# Patient Record
Sex: FEMALE
[De-identification: names, ages, dates, MRNs, and addresses within clinical notes are randomized per-mention and may not be internally consistent; named-entity substitution may affect disease eponyms.]

---

## 2023-04-17 PROBLEM — Z00.00 ENCOUNTER FOR PREVENTIVE HEALTH EXAMINATION: Status: ACTIVE | Noted: 2023-04-17

## 2023-04-21 ENCOUNTER — APPOINTMENT (OUTPATIENT)
Dept: UROLOGY | Facility: CLINIC | Age: 48
End: 2023-04-21
Payer: COMMERCIAL

## 2023-04-21 VITALS
SYSTOLIC BLOOD PRESSURE: 144 MMHG | DIASTOLIC BLOOD PRESSURE: 75 MMHG | BODY MASS INDEX: 22.4 KG/M2 | TEMPERATURE: 98.3 F | WEIGHT: 131.2 LBS | HEIGHT: 64 IN | HEART RATE: 85 BPM | OXYGEN SATURATION: 100 %

## 2023-04-21 PROCEDURE — 99204 OFFICE O/P NEW MOD 45 MIN: CPT

## 2023-04-21 NOTE — HISTORY OF PRESENT ILLNESS
[FreeTextEntry1] : Name RONN LINTON \par MRN [id number] \par  May 24 1975 \par ------------------------------------------------------------------------------------------------------------------------------------------- \par Date of First Visit: 23\par Referring Provider/PCP: self (Mire[facility])\par ------------------------------------------------------------------------------------------------------------------------------------------- \par CC: interstitial cystitis\par \par History of Present Illness: RONN LINTON is a 47 year old female PMH uterine fibroids hypothyroidism status post right hemithyroidectomy who presents for evaluation and management of interstitial cystitis.  She has a remote history of recurrent urinary tract infections followed by diagnosis with interstitial cystitis approximately 10 years ago.  Her urinary frequency was initially controlled with diet and anti-inflammatory medications.  She states that her symptoms have been okay for a while until 2022 when she underwent a colonoscopy.  She notes she had increased abdominal pain after the procedure and was admitted to the hospital, diagnosed with distention due to gas likely related to the procedure in the setting of incomplete bowel prep.  States that "pelvis did not respond well "to the procedure and the recovery and since then she has had a dull abdominal pain that is on and off in quality, also associated with pressure in the suprapubic region.  Ameliorated by heating pads and relaxation techniques.  In 2022, frequency and dysuria symptoms of old flared up again.  She was started on antibiotics empirically but urine culture drawn prior to starting treatment was negative.  Symptoms initially abated for several weeks but have been constant since 2022. \par \par In the office today, patient states she is mildly symptomatic with lower abdominal/suprapubic pressure.  Urinary urgency comes on sometimes though not bothered at this particular moment with frequency every 2 hours that tends to occur in the late afternoon early evening.  Does not have any nocturia or bothersome frequency or urgency in the morning.  Drinks at least 1 cup of caffeinated coffee or black tea daily.  Never smoker, occasional spicy food intake.\par

## 2023-04-21 NOTE — ASSESSMENT
[FreeTextEntry1] : Assessment: 47-year-old female with interstitial cystitis, currently flaring.\par \par \par Plan:\par -Discussed importance of strict abstinence from all caffeinated products and alcohol\par -Avoid all other bladder irritants such as tobacco products, spicy foods\par -We discussed the potential benefit of medications for symptom relief such as anticholinergics and beta 3 agonist-patient would like to hold off on medical therapy for now and proceed with behavioral therapy alone\par -Follow-up in 3 months for symptom check

## 2023-04-26 ENCOUNTER — APPOINTMENT (OUTPATIENT)
Dept: UROLOGY | Facility: CLINIC | Age: 48
End: 2023-04-26

## 2023-08-02 ENCOUNTER — APPOINTMENT (OUTPATIENT)
Dept: UROLOGY | Facility: CLINIC | Age: 48
End: 2023-08-02

## 2023-08-09 ENCOUNTER — APPOINTMENT (OUTPATIENT)
Dept: UROLOGY | Facility: CLINIC | Age: 48
End: 2023-08-09

## 2023-08-15 ENCOUNTER — APPOINTMENT (OUTPATIENT)
Dept: UROLOGY | Facility: CLINIC | Age: 48
End: 2023-08-15
Payer: COMMERCIAL

## 2023-08-15 VITALS
TEMPERATURE: 98.5 F | HEIGHT: 64 IN | BODY MASS INDEX: 22.36 KG/M2 | WEIGHT: 131 LBS | OXYGEN SATURATION: 100 % | SYSTOLIC BLOOD PRESSURE: 124 MMHG | HEART RATE: 64 BPM | DIASTOLIC BLOOD PRESSURE: 78 MMHG

## 2023-08-15 DIAGNOSIS — N30.10 INTERSTITIAL CYSTITIS (CHRONIC) W/OUT HEMATURIA: ICD-10-CM

## 2023-08-15 PROCEDURE — 99213 OFFICE O/P EST LOW 20 MIN: CPT

## 2023-08-15 NOTE — ASSESSMENT
[FreeTextEntry1] : Assessment: 47-year-old female with interstitial cystitis, currently flaring. Sensitive to caffeine.  We discussed alternatives such as PFPT and medications. She responds well to avoiding caffeine. We also discussed other bladder irritants to limit/avoid, such as alcohol and spicy/acidic foods.   Plan: -Avoid all caffeine products -Recommend switching to decaffeinated -Follow up 3 mo or as needed

## 2023-08-15 NOTE — HISTORY OF PRESENT ILLNESS
[FreeTextEntry1] : Name RONN LINTON MRN [id number]  May 24 1975 ------------------------------------------------------------------------------------------------------------------------------------------- Date of First Visit: 23 Referring Provider/PCP: self ([facility]) ------------------------------------------------------------------------------------------------------------------------------------------- CC: interstitial cystitis  History of Present Illness: RONN LINTON is a 47-year-old female PMH uterine fibroids hypothyroidism status post right hemithyroidectomy who presents for evaluation and management of interstitial cystitis. She has a remote history of recurrent urinary tract infections followed by diagnosis with interstitial cystitis approximately 10 years ago. Her urinary frequency was initially controlled with diet and anti-inflammatory medications. She states that her symptoms have been okay for a while until 2022 when she underwent a colonoscopy. She notes she had increased abdominal pain after the procedure and was admitted to the hospital, diagnosed with distention due to gas likely related to the procedure in the setting of incomplete bowel prep. States that "pelvis did not respond well "to the procedure and the recovery and since then she has had a dull abdominal pain that is on and off in quality, also associated with pressure in the suprapubic region. Ameliorated by heating pads and relaxation techniques. In 2022, frequency and dysuria symptoms of old flared up again. She was started on antibiotics empirically, but urine culture drawn prior to starting treatment was negative. Symptoms initially abated for several weeks but have been constant since 2022.  In the office today, patient states she is mildly symptomatic with lower abdominal/suprapubic pressure. Urinary urgency comes on sometimes though not bothered at this particular moment with frequency every 2 hours that tends to occur in the late afternoon early evening. Does not have any nocturia or bothersome frequency or urgency in the morning. Drinks at least 1 cup of caffeinated coffee or black tea daily. Never smoker, occasional spicy food intake. ------------------------------------------------------------------------------------------------------------------------------------------- Interval History (08/15/2023): bladder pressure and urinary urgency symptoms resolved very quickly after last visit with avoiding caffeine. Was doing quite well for 2 months but started having symptoms again over the past 1-2 weeks. Started drinking cups of tea on occasion and a couple cups of coffee weekly. Remains uninterested in medications.

## 2024-03-10 PROBLEM — N30.10 CHRONIC INTERSTITIAL CYSTITIS: Status: ACTIVE | Noted: 2023-08-15
